# Patient Record
Sex: MALE
[De-identification: names, ages, dates, MRNs, and addresses within clinical notes are randomized per-mention and may not be internally consistent; named-entity substitution may affect disease eponyms.]

---

## 2018-10-21 ENCOUNTER — TRANSCRIPTION ENCOUNTER (OUTPATIENT)
Age: 33
End: 2018-10-21

## 2019-05-18 ENCOUNTER — TRANSCRIPTION ENCOUNTER (OUTPATIENT)
Age: 34
End: 2019-05-18

## 2021-03-29 ENCOUNTER — APPOINTMENT (OUTPATIENT)
Dept: DISASTER EMERGENCY | Facility: OTHER | Age: 36
End: 2021-03-29
Payer: COMMERCIAL

## 2021-03-29 PROCEDURE — 0011A: CPT

## 2021-04-26 ENCOUNTER — APPOINTMENT (OUTPATIENT)
Dept: DISASTER EMERGENCY | Facility: OTHER | Age: 36
End: 2021-04-26

## 2022-10-14 PROBLEM — Z00.00 ENCOUNTER FOR PREVENTIVE HEALTH EXAMINATION: Status: ACTIVE | Noted: 2022-10-14

## 2022-10-15 ENCOUNTER — INPATIENT (INPATIENT)
Facility: HOSPITAL | Age: 37
LOS: 1 days | Discharge: HOME | DRG: 392 | End: 2022-10-17
Attending: STUDENT IN AN ORGANIZED HEALTH CARE EDUCATION/TRAINING PROGRAM | Admitting: STUDENT IN AN ORGANIZED HEALTH CARE EDUCATION/TRAINING PROGRAM
Payer: COMMERCIAL

## 2022-10-15 VITALS
HEIGHT: 68 IN | HEART RATE: 86 BPM | DIASTOLIC BLOOD PRESSURE: 105 MMHG | OXYGEN SATURATION: 98 % | SYSTOLIC BLOOD PRESSURE: 163 MMHG | RESPIRATION RATE: 18 BRPM | WEIGHT: 171.96 LBS | TEMPERATURE: 98 F

## 2022-10-15 DIAGNOSIS — R13.10 DYSPHAGIA, UNSPECIFIED: ICD-10-CM

## 2022-10-15 LAB
ALBUMIN SERPL ELPH-MCNC: 4.8 G/DL — SIGNIFICANT CHANGE UP (ref 3.3–5)
ALP SERPL-CCNC: 46 U/L — SIGNIFICANT CHANGE UP (ref 40–120)
ALT FLD-CCNC: 17 U/L — SIGNIFICANT CHANGE UP (ref 10–45)
ANION GAP SERPL CALC-SCNC: 12 MMOL/L — SIGNIFICANT CHANGE UP (ref 5–17)
AST SERPL-CCNC: 18 U/L — SIGNIFICANT CHANGE UP (ref 10–40)
BASOPHILS # BLD AUTO: 0.07 K/UL — SIGNIFICANT CHANGE UP (ref 0–0.2)
BASOPHILS NFR BLD AUTO: 1.1 % — SIGNIFICANT CHANGE UP (ref 0–2)
BILIRUB SERPL-MCNC: 1 MG/DL — SIGNIFICANT CHANGE UP (ref 0.2–1.2)
BUN SERPL-MCNC: 9 MG/DL — SIGNIFICANT CHANGE UP (ref 7–23)
CALCIUM SERPL-MCNC: 9.7 MG/DL — SIGNIFICANT CHANGE UP (ref 8.4–10.5)
CHLORIDE SERPL-SCNC: 101 MMOL/L — SIGNIFICANT CHANGE UP (ref 96–108)
CO2 SERPL-SCNC: 24 MMOL/L — SIGNIFICANT CHANGE UP (ref 22–31)
CREAT SERPL-MCNC: 1.25 MG/DL — SIGNIFICANT CHANGE UP (ref 0.5–1.3)
EGFR: 77 ML/MIN/1.73M2 — SIGNIFICANT CHANGE UP
EOSINOPHIL # BLD AUTO: 0.08 K/UL — SIGNIFICANT CHANGE UP (ref 0–0.5)
EOSINOPHIL NFR BLD AUTO: 1.2 % — SIGNIFICANT CHANGE UP (ref 0–6)
FLUAV AG NPH QL: SIGNIFICANT CHANGE UP
FLUBV AG NPH QL: SIGNIFICANT CHANGE UP
GLUCOSE SERPL-MCNC: 93 MG/DL — SIGNIFICANT CHANGE UP (ref 70–99)
HCT VFR BLD CALC: 46.9 % — SIGNIFICANT CHANGE UP (ref 39–50)
HGB BLD-MCNC: 16.4 G/DL — SIGNIFICANT CHANGE UP (ref 13–17)
IMM GRANULOCYTES NFR BLD AUTO: 0.3 % — SIGNIFICANT CHANGE UP (ref 0–0.9)
LYMPHOCYTES # BLD AUTO: 2.21 K/UL — SIGNIFICANT CHANGE UP (ref 1–3.3)
LYMPHOCYTES # BLD AUTO: 33.3 % — SIGNIFICANT CHANGE UP (ref 13–44)
MCHC RBC-ENTMCNC: 27.6 PG — SIGNIFICANT CHANGE UP (ref 27–34)
MCHC RBC-ENTMCNC: 35 GM/DL — SIGNIFICANT CHANGE UP (ref 32–36)
MCV RBC AUTO: 78.8 FL — LOW (ref 80–100)
MONOCYTES # BLD AUTO: 0.31 K/UL — SIGNIFICANT CHANGE UP (ref 0–0.9)
MONOCYTES NFR BLD AUTO: 4.7 % — SIGNIFICANT CHANGE UP (ref 2–14)
NEUTROPHILS # BLD AUTO: 3.95 K/UL — SIGNIFICANT CHANGE UP (ref 1.8–7.4)
NEUTROPHILS NFR BLD AUTO: 59.4 % — SIGNIFICANT CHANGE UP (ref 43–77)
NRBC # BLD: 0 /100 WBCS — SIGNIFICANT CHANGE UP (ref 0–0)
PLATELET # BLD AUTO: 266 K/UL — SIGNIFICANT CHANGE UP (ref 150–400)
POTASSIUM SERPL-MCNC: 4 MMOL/L — SIGNIFICANT CHANGE UP (ref 3.5–5.3)
POTASSIUM SERPL-SCNC: 4 MMOL/L — SIGNIFICANT CHANGE UP (ref 3.5–5.3)
PROT SERPL-MCNC: 7.2 G/DL — SIGNIFICANT CHANGE UP (ref 6–8.3)
RBC # BLD: 5.95 M/UL — HIGH (ref 4.2–5.8)
RBC # FLD: 12.5 % — SIGNIFICANT CHANGE UP (ref 10.3–14.5)
RSV RNA NPH QL NAA+NON-PROBE: SIGNIFICANT CHANGE UP
SARS-COV-2 RNA SPEC QL NAA+PROBE: SIGNIFICANT CHANGE UP
SODIUM SERPL-SCNC: 137 MMOL/L — SIGNIFICANT CHANGE UP (ref 135–145)
WBC # BLD: 6.64 K/UL — SIGNIFICANT CHANGE UP (ref 3.8–10.5)
WBC # FLD AUTO: 6.64 K/UL — SIGNIFICANT CHANGE UP (ref 3.8–10.5)

## 2022-10-15 PROCEDURE — 99285 EMERGENCY DEPT VISIT HI MDM: CPT

## 2022-10-15 PROCEDURE — 70490 CT SOFT TISSUE NECK W/O DYE: CPT | Mod: 26,MA

## 2022-10-15 PROCEDURE — 71250 CT THORAX DX C-: CPT | Mod: 26,MA

## 2022-10-15 RX ORDER — NYSTATIN 500MM UNIT
500000 POWDER (EA) MISCELLANEOUS ONCE
Refills: 0 | Status: COMPLETED | OUTPATIENT
Start: 2022-10-15 | End: 2022-10-15

## 2022-10-15 RX ADMIN — Medication 500000 UNIT(S): at 20:55

## 2022-10-15 NOTE — ED PROVIDER NOTE - ATTENDING CONTRIBUTION TO CARE
Attending (Rick Ordaz D.O.):  I have personally seen and examined this patient. I have performed a substantive portion of the visit including all aspects of the medical decision making. Resident, fellow, student, and/or ACP note reviewed. I agree on the plan of care except where noted.    see mdm

## 2022-10-15 NOTE — ED PROVIDER NOTE - PROGRESS NOTE DETAILS
Frank PGY2  GI consulted for dysphagia.  PMD Dr. Trip Stanley Frank PGY2  Admitted to medicine for further management of dyaphagia/ Frank PGY2  Admitted to medicine for further management of dyaphagia/    Attending (Rick Ordaz D.O.):  Patient did not want IV, states it was hurting at site. Site was clean/dry/intact. Iv flushed and pulled back with ease. Discussed with patient that it will be needed if additional blood work is needed or for procedural sedation for anestheisa if GI plans for endoscopy. Patient expressed verbal understanding and stated he would be okay getting another IV. IV removed by this provider. Hemostasis with pressure. Site remained clean/dry/intact after removal.

## 2022-10-15 NOTE — ED PROVIDER NOTE - OBJECTIVE STATEMENT
37y M w/ PMHx of eosinophilic esophagitis w/ prior dilation in the past, currently on Singulair, inhaled steroid, PPI coming in for progressive feeling of dysphagia. Pt is only tolerating liquids and pureed foods. Denies F/C. Had an episode of vomiting after last meal. GI Dr. Mcnally recommended he get a barium swallow and see house GI to see if he needs another dilation.

## 2022-10-15 NOTE — ED CLERICAL - NS ED CLERK NOTE PRE-ARRIVAL INFORMATION; ADDITIONAL PRE-ARRIVAL INFORMATION
CC/Reason For referral: Difficulty Swallowing  Preferred Consultant(if applicable):  Who admits for you (if needed):  Do you have documents you would like to fax over?  Would you still like to speak to an ED attending? Yes

## 2022-10-15 NOTE — ED ADULT NURSE NOTE - OBJECTIVE STATEMENT
37 y old male with PMH of eosiniphilic esophagitis presents to the ED from home for barium swallow. 37 y old male with PMH of eosinophilic esophagitis presents to the ED from home sent by GI for barium swallow. Pt reports he has been having difficulty swallowing, only able to tolerate a pureed diet. Pt has required esophageal dilation in the past. Denies difficulty breathing, pain, SOB. Denies ha, fevers, chills, CP, abd pain, n/v/d, urinary sx. Pt A&O x 4, ambulatory. Respirations nonlabored on RA. Abdomen soft and nontender. Skin warm, dry and intact. Bed locked in lowest position, side rails up and call bell in reach.

## 2022-10-15 NOTE — ED PROVIDER NOTE - CLINICAL SUMMARY MEDICAL DECISION MAKING FREE TEXT BOX
Rick Ordaz (DO): 37y M w/ PMHx of eosinophilic esophagitis w/ prior dilation in the past, currently on Singulair, inhaled steroid, PPI coming in for progressive feeling of dysphagia. Pt is only tolerating liquids and pureed foods. Denies F/C. Had an episode of vomiting after last meal. GI Dr. Mcnally recommended he get a barium swallow and see house GI to see if he needs another dilation. Here is hemodynamically stable except hypertensive. Intact oral pharynx w/o thrush visible, no palpable nodules on throat, neck supple, no stridor. Possibility of candida esophagitis given inhaled steroid. Discussed w/ XR tech who said is unable to get barium swallow at this time. Will get non contrast CT of neck and chest to eval for FB, food impaction, esophageal stricture. Will trial oral nystatin in event of candida esophagitis. All discussed w/ patient who is agreeable to plan.

## 2022-10-16 DIAGNOSIS — K76.9 LIVER DISEASE, UNSPECIFIED: ICD-10-CM

## 2022-10-16 DIAGNOSIS — R13.10 DYSPHAGIA, UNSPECIFIED: ICD-10-CM

## 2022-10-16 DIAGNOSIS — K20.0 EOSINOPHILIC ESOPHAGITIS: ICD-10-CM

## 2022-10-16 PROCEDURE — 12345: CPT | Mod: NC

## 2022-10-16 PROCEDURE — 99223 1ST HOSP IP/OBS HIGH 75: CPT

## 2022-10-16 PROCEDURE — 99253 IP/OBS CNSLTJ NEW/EST LOW 45: CPT | Mod: GC

## 2022-10-16 RX ORDER — PANTOPRAZOLE SODIUM 20 MG/1
40 TABLET, DELAYED RELEASE ORAL
Refills: 0 | Status: DISCONTINUED | OUTPATIENT
Start: 2022-10-16 | End: 2022-10-16

## 2022-10-16 RX ORDER — MONTELUKAST 4 MG/1
10 TABLET, CHEWABLE ORAL DAILY
Refills: 0 | Status: DISCONTINUED | OUTPATIENT
Start: 2022-10-16 | End: 2022-10-17

## 2022-10-16 RX ORDER — SODIUM CHLORIDE 9 MG/ML
1000 INJECTION, SOLUTION INTRAVENOUS
Refills: 0 | Status: DISCONTINUED | OUTPATIENT
Start: 2022-10-16 | End: 2022-10-17

## 2022-10-16 RX ORDER — PANTOPRAZOLE SODIUM 20 MG/1
40 TABLET, DELAYED RELEASE ORAL EVERY 12 HOURS
Refills: 0 | Status: DISCONTINUED | OUTPATIENT
Start: 2022-10-16 | End: 2022-10-17

## 2022-10-16 RX ADMIN — SODIUM CHLORIDE 125 MILLILITER(S): 9 INJECTION, SOLUTION INTRAVENOUS at 20:32

## 2022-10-16 RX ADMIN — PANTOPRAZOLE SODIUM 40 MILLIGRAM(S): 20 TABLET, DELAYED RELEASE ORAL at 20:32

## 2022-10-16 RX ADMIN — PANTOPRAZOLE SODIUM 40 MILLIGRAM(S): 20 TABLET, DELAYED RELEASE ORAL at 06:52

## 2022-10-16 NOTE — CHART NOTE - NSCHARTNOTEFT_GEN_A_CORE
Andre Reyes, M.D.  Pager: [phone]  Office: [phone]    Patient is a 37y old  Male who presents with a chief complaint of dysphagia, regurgitation (16 Oct 2022 02:42)          SUBJECTIVE / OVERNIGHT EVENTS:    No acute overnight events.    ROS: ( - ) Fever, ( - )Chills,  ( - )Nausea/Vomiting, ( - ) Cough, ( - )Shortness of breath, ( - )Chest Pain    MEDICATIONS  (STANDING):  Budesonide 1mg/ 8ml slurry 1 milliGRAM(s) 1 milliGRAM(s) Oral every 12 hours  montelukast 10 milliGRAM(s) Oral daily  pantoprazole    Tablet 40 milliGRAM(s) Oral before breakfast    MEDICATIONS  (PRN):          T(C): 36.7 (10-16 @ 04:44), Max: 37 (10-16 @ 01:46)   HR: 73   BP: 129/91   RR: 18   SpO2: 97%    PHYSICAL EXAM:    CONSTITUTIONAL: NAD, well-developed, well-groomed  EYES: PERRLA; conjunctiva and sclera clear  ENMT: Moist oral mucosa, no pharyngeal injection or exudates; normal dentition  NECK: Supple, no palpable masses; no thyromegaly  RESPIRATORY: Normal respiratory effort; lungs are clear to auscultation bilaterally  CARDIOVASCULAR: Regular rate and rhythm, normal S1 and S2, no murmur/rub/gallop; No lower extremity edema; Peripheral pulses are 2+ bilaterally  ABDOMEN: Nontender to palpation, normoactive bowel sounds, no rebound/guarding; No hepatosplenomegaly  MUSCULOSKELETAL:  Normal gait; no clubbing or cyanosis of digits; no joint swelling or tenderness to palpation  PSYCH: A+O to person, place, and time; affect appropriate  NEUROLOGY: CN 2-12 are intact and symmetric; no gross sensory deficits   SKIN: No rashes; no palpable lesions      LABS:                        16.4   6.64  )-----------( 266      ( 15 Oct 2022 21:06 )             46.9      10-15    137  |  101  |  9   ----------------------------<  93  4.0   |  24  |  1.25    Ca    9.7      15 Oct 2022 21:06    TPro  7.2  /  Alb  4.8  /  TBili  1.0  /  DBili  x   /  AST  18  /  ALT  17  /  AlkPhos  46  10-15       CAPILLARY BLOOD GLUCOSE          RADIOLOGY & ADDITIONAL TESTS:    Imaging Personally Reviewed:  Consultant(s) Notes Reviewed:    Care Discussed with Consultants/Other Providers: Andre Reyes, M.D.  Pager: [phone]  Office: [phone]    Patient is a 37y old  Male who presents with a chief complaint of dysphagia, regurgitation (16 Oct 2022 02:42)          SUBJECTIVE / OVERNIGHT EVENTS:    Patient admitted overnight patient seen and examined this morning at bedside with family at bedside. Patient apprehensive about drinking anything even water.    MEDICATIONS  (STANDING):  Budesonide 1mg/ 8ml slurry 1 milliGRAM(s) 1 milliGRAM(s) Oral every 12 hours  montelukast 10 milliGRAM(s) Oral daily  pantoprazole    Tablet 40 milliGRAM(s) Oral before breakfast    MEDICATIONS  (PRN):          T(C): 36.7 (10-16 @ 04:44), Max: 37 (10-16 @ 01:46)   HR: 73   BP: 129/91   RR: 18   SpO2: 97%    PHYSICAL EXAM:    CONSTITUTIONAL: NAD, well-developed, well-groomed  EYES: PERRLA; conjunctiva and sclera clear  ENMT: Moist oral mucosa, no pharyngeal injection or exudates; normal dentition  NECK: Supple, no palpable masses; no thyromegaly  RESPIRATORY: Normal respiratory effort; lungs are clear to auscultation bilaterally  CARDIOVASCULAR: Regular rate and rhythm, normal S1 and S2, no murmur/rub/gallop; No lower extremity edema; Peripheral pulses are 2+ bilaterally  ABDOMEN: Nontender to palpation, normoactive bowel sounds, no rebound/guarding; No hepatosplenomegaly  MUSCULOSKELETAL:  Normal gait; no clubbing or cyanosis of digits; no joint swelling or tenderness to palpation  PSYCH: A+O to person, place, and time; affect appropriate  NEUROLOGY: CN 2-12 are intact and symmetric; no gross sensory deficits   SKIN: No rashes; no palpable lesions      LABS:                        16.4   6.64  )-----------( 266      ( 15 Oct 2022 21:06 )             46.9      10-15    137  |  101  |  9   ----------------------------<  93  4.0   |  24  |  1.25    Ca    9.7      15 Oct 2022 21:06    TPro  7.2  /  Alb  4.8  /  TBili  1.0  /  DBili  x   /  AST  18  /  ALT  17  /  AlkPhos  46  10-15     37M c hx esosinophilic esophagitis pw worsening dysphagia.  Patient seen by gastroenterology the plan is for an endoscopy tomorrow.   Patient made aware of the plan.  Will start the patient on some gentle Ivy hydration because he's apprehensive about drinking liquids.   Will give the patient a clear liquid diet for now and make him impure after midnights for procedure tomorrow.   Further planning pending the endoscopic evaluation

## 2022-10-16 NOTE — H&P ADULT - NSHPSOCIALHISTORY_GEN_ALL_CORE
Social History:    Marital Status: ( x ) , (  ) Single, (  ) , (  ) , (  )   # of Children: 1  Lives with: (  ) alone, ( x ) children, ( x ) spouse, (  ) parents, (  ) siblings, (  ) friends, (  ) other:   Occupation:     Substance Use/Illicit Drugs: (  ) never used vs other:   Tobacco Usage: ( x ) never smoked, (  ) former smoker, (  ) current smoker and Total Pack-Years:   Last Alcohol Usage/Frequency/Amount/Withdrawal/Hx of Abuse:  none  Foreign travel:   Animal exposure:

## 2022-10-16 NOTE — CONSULT NOTE ADULT - ASSESSMENT
#Dysphagia  #EoE  Will need rule out stricture although seems less likely, most likely due to untreated EoE    Recs:  -IV PPI BID  - clear liquid diet, NPO after midnight  - Plan for EGD +/- dilation tomorrow    Recommendations preliminary until signed by attending.     Ehsan Barroso MD  Gastroenterology/Hepatology Fellow  Contact on-call GI fellow via answering service ([phone]) from 5pm-8am AND on weekends/holidays

## 2022-10-16 NOTE — H&P ADULT - NSHPLABSRESULTS_GEN_ALL_CORE
Personally reviewed old records.  Personally reviewed labs.  Personally reviewed imaging.                        16.4   6.64  )-----------( 266      ( 15 Oct 2022 21:06 )             46.9       10-15    137  |  101  |  9   ----------------------------<  93  4.0   |  24  |  1.25    Ca    9.7      15 Oct 2022 21:06    TPro  7.2  /  Alb  4.8  /  TBili  1.0  /  DBili  x   /  AST  18  /  ALT  17  /  AlkPhos  46  10-15            LIVER FUNCTIONS - ( 15 Oct 2022 21:06 )  Alb: 4.8 g/dL / Pro: 7.2 g/dL / ALK PHOS: 46 U/L / ALT: 17 U/L / AST: 18 U/L / GGT: x

## 2022-10-16 NOTE — CONSULT NOTE ADULT - ATTENDING COMMENTS
Assessment:   Mr. Brink is a 37 year old gentleman with significant history of eosinophillic esophagitis who presented with worsening symptoms of dysphagia initially to solids then liquids for the past 7-10 days (more intensely over last 48 hours prompting ED evaluation). He spoke with his outpatient GI doctor who recommended he present to the ED as well. He has had a known history of dysphagia for the past 5 years, last EGD was about 2 years ago. He reports that the diagnosis was made and he was empirically dilated at that time which offered some relief of symptoms. Since that time he has only experienced sporadic episodes as he had been more careful and strict with his diet. He has no been adherent to all recommended therapies but has had intermittent treatment. He recently started oral Singulair / Fluticasone and Omeprazole for approximately 1 week but his symptoms progressively worsened despite these therapies. Patient denies regurgitation (as he has not had solid foods in days), tolerating minimal PO clear liquids mostly water and has been tolerating secretions without difficulty. Presentation consistent with underlying EoE, can not rule out stricture/stenosis, would favor inpatient non-urgent upper endoscopy with biopsies and potentially esophageal dilation. Patient amenable to current plan of care.     Plan:  Make NPO at midnight.  OK for clear liquids until midnight.   Would give IV PPI BID (until cleared for oral intake then transition to PO)  Will add on for EGD +/- dilation tomorrow.     Thank you for allowing us to participate in the continued care of Mr. Brink.  We will continue to follow along with you.

## 2022-10-16 NOTE — CONSULT NOTE ADULT - SUBJECTIVE AND OBJECTIVE BOX
HPI:  37M c hx esosinophilic esophagitis pw worsening dysphagia. Pt states he's had dysphagia for about 4 yrs. he had an EGD 2 years ago and reportedly had dilation then, and was diagnosed with EE. Pt reports having changed his diet but was not on any medications until 1 week ago, when he had an episode of regurgitation after eating ravioli. Pt states since then he has been unable to tolerate significant solids, eating mostly soft pasta and soup. He was started on oral singulair, fluticasone, and omeprazole. Given his worsening symptoms, pt was sent in by his GI doctor for a barium swallow test and for house GI to poss scope and dilated. last bm yesterday. Reports able to drink liquids with issue. No fevers. Reports symptoms of epigastric "heart burn" pain.    Allergies:  No Known Allergies        Hospital Medications:  Budesonide 1mg/ 8ml slurry 1 milliGRAM(s) 1 milliGRAM(s) Oral every 12 hours  montelukast 10 milliGRAM(s) Oral daily  pantoprazole    Tablet 40 milliGRAM(s) Oral before breakfast      PMHX/PSHX:  Eosinophilic esophagitis    No significant past surgical history        Family history:  No pertinent family history in first degree relatives    Family history of early CAD (Father)        Social History: no smoking    ROS:   General:  No fevers, chills or night sweats  ENT:  No sore throat or dysphagia  CV:  No pain or palpitations  Resp:  No dyspnea, cough or  wheezing  GI:  as above  Skin:  No rash or edema  Neuro: no weakness   Hematologic: no bleeding  Musculoskeletal: no muscle pain or join pain  Psych: no agitation     : no dysuria      PHYSICAL EXAM:   GENERAL:  NAD, Appears stated age  HEENT:  NC/AT,  conjunctivae clear and pink, sclera -anicteric  CHEST:  CTA B/L, Normal effort  HEART:  RRR S1/S2,  ABDOMEN:  Soft, non-tender, non-distended,  no masses   EXTREMITIES:  No cyanosis or Edema  SKIN:  Warm & Dry. No rash or erythema  NEURO:  Alert, oriented, no focal deficit    Vital Signs:  Vital Signs Last 24 Hrs  T(C): 36.7 (16 Oct 2022 04:44), Max: 37 (16 Oct 2022 01:46)  T(F): 98.1 (16 Oct 2022 04:44), Max: 98.6 (16 Oct 2022 01:46)  HR: 73 (16 Oct 2022 04:44) (70 - 86)  BP: 129/91 (16 Oct 2022 04:44) (129/91 - 163/105)  BP(mean): --  RR: 18 (16 Oct 2022 04:44) (16 - 18)  SpO2: 97% (16 Oct 2022 04:44) (96% - 98%)    Parameters below as of 16 Oct 2022 04:44  Patient On (Oxygen Delivery Method): room air      Daily Height in cm: 172.72 (15 Oct 2022 16:57)    Daily     LABS:                        16.4   6.64  )-----------( 266      ( 15 Oct 2022 21:06 )             46.9     Mean Cell Volume: 78.8 fl (10-15-22 @ 21:06)    10-15    137  |  101  |  9   ----------------------------<  93  4.0   |  24  |  1.25    Ca    9.7      15 Oct 2022 21:06    TPro  7.2  /  Alb  4.8  /  TBili  1.0  /  DBili  x   /  AST  18  /  ALT  17  /  AlkPhos  46  10-15    LIVER FUNCTIONS - ( 15 Oct 2022 21:06 )  Alb: 4.8 g/dL / Pro: 7.2 g/dL / ALK PHOS: 46 U/L / ALT: 17 U/L / AST: 18 U/L / GGT: x                                       16.4   6.64  )-----------( 266      ( 15 Oct 2022 21:06 )             46.9     Imaging:     HPI:  37M c hx esosinophilic esophagitis pw worsening dysphagia. Pt states he's had dysphagia for about 4 yrs. He had an EGD 2 years ago and reportedly had dilation then, and was diagnosed with EE. Since dilation, has been having couple episode dysphagia but self resolves. About 1.5 weeks ago, sxs advanced and worsened. Reports having changed his diet but was not on any medications until 1 week ago, when he had an episode of regurgitation after eating ravioli. Patient was started on oral singulair, fluticasone, and omeprazole 1 week ago and sxs got worst. Given his worsening symptoms, pt was sent in by his GI doctor for a barium swallow test and for house GI to poss scope and dilated. At bedside, patient having issues swallowing even water feels things are tight.     Allergies:  No Known Allergies        Hospital Medications:  Budesonide 1mg/ 8ml slurry 1 milliGRAM(s) 1 milliGRAM(s) Oral every 12 hours  montelukast 10 milliGRAM(s) Oral daily  pantoprazole    Tablet 40 milliGRAM(s) Oral before breakfast      PMHX/PSHX:  Eosinophilic esophagitis    No significant past surgical history        Family history:  No pertinent family history in first degree relatives    Family history of early CAD (Father)        Social History: no smoking    ROS:   General:  No fevers, chills or night sweats  ENT:  No sore throat or dysphagia  CV:  No pain or palpitations  Resp:  No dyspnea, cough or  wheezing  GI:  as above  Skin:  No rash or edema  Neuro: no weakness   Hematologic: no bleeding  Musculoskeletal: no muscle pain or join pain  Psych: no agitation     : no dysuria      PHYSICAL EXAM:   GENERAL:  NAD, Appears stated age  HEENT:  NC/AT,  conjunctivae clear and pink, sclera -anicteric  CHEST:  CTA B/L, Normal effort  HEART:  RRR S1/S2,  ABDOMEN:  Soft, non-tender, non-distended,  no masses   EXTREMITIES:  No cyanosis or Edema  SKIN:  Warm & Dry. No rash or erythema  NEURO:  Alert, oriented, no focal deficit    Vital Signs:  Vital Signs Last 24 Hrs  T(C): 36.7 (16 Oct 2022 04:44), Max: 37 (16 Oct 2022 01:46)  T(F): 98.1 (16 Oct 2022 04:44), Max: 98.6 (16 Oct 2022 01:46)  HR: 73 (16 Oct 2022 04:44) (70 - 86)  BP: 129/91 (16 Oct 2022 04:44) (129/91 - 163/105)  BP(mean): --  RR: 18 (16 Oct 2022 04:44) (16 - 18)  SpO2: 97% (16 Oct 2022 04:44) (96% - 98%)    Parameters below as of 16 Oct 2022 04:44  Patient On (Oxygen Delivery Method): room air      Daily Height in cm: 172.72 (15 Oct 2022 16:57)    Daily     LABS:                        16.4   6.64  )-----------( 266      ( 15 Oct 2022 21:06 )             46.9     Mean Cell Volume: 78.8 fl (10-15-22 @ 21:06)    10-15    137  |  101  |  9   ----------------------------<  93  4.0   |  24  |  1.25    Ca    9.7      15 Oct 2022 21:06    TPro  7.2  /  Alb  4.8  /  TBili  1.0  /  DBili  x   /  AST  18  /  ALT  17  /  AlkPhos  46  10-15    LIVER FUNCTIONS - ( 15 Oct 2022 21:06 )  Alb: 4.8 g/dL / Pro: 7.2 g/dL / ALK PHOS: 46 U/L / ALT: 17 U/L / AST: 18 U/L / GGT: x                                       16.4   6.64  )-----------( 266      ( 15 Oct 2022 21:06 )             46.9     Imaging:  Reviewed

## 2022-10-16 NOTE — H&P ADULT - HISTORY OF PRESENT ILLNESS
37M c hx esosinophilic esophagitis pw worsening dysphagia.    Pt states he's had dysphagia for about 4 yrs. he had an EGD 2 years ago and reportedly had dilation then, and was diagnosed with EE. Pt reports having changed his diet but was not on any medications until 1 week ago, when he had an episode of regurgitation after eating ravioli. Pt states since then he has been unable to tolerate significant solids, eating mostly soft pasta and soup. He was started on oral singulair, fluticasone, and omeprazole. Given his worsening symptoms, pt was sent in by his GI doctor for a barium swallow test and for house GI to poss scope and dilated. last bm yesterday. Reports able to drink liquids with issue. No fevers. Reports symptoms of epigastric "heart burn" pain.

## 2022-10-16 NOTE — H&P ADULT - NSHPPHYSICALEXAM_GEN_ALL_CORE
PHYSICAL EXAM:   GENERAL: Alert. Not confused. No acute distress. Not thin. Not cachectic. Not obese.  HEAD:  Atraumatic. Normocephalic.  EYES: EOMI. PERRLA. Normal conjunctiva/sclera.  ENT: Neck supple. No JVD. Moist oral mucosa. Not edentulous. No thrush.  LYMPH: Normal supraclavicular/cervical lymph nodes.   CARDIAC: Not tachy, Not liset. Regular rhythm. Not irregularly irregular. S1. S2.   LUNG/CHEST: CTAB. BS equal bilaterally. No wheezes. No rales. No rhonchi.  ABDOMEN: Soft. No tenderness. No distension. No fluid wave. Normal bowel sounds.  BACK: No midline/vertebral tenderness. No flank tenderness.  VASCULAR: +2 b/l radial or ulnar pulses. Palpable DP pulses.  EXTREMITIES:  No clubbing. No cyanosis. No edema. Moving all 4.  NEUROLOGY: A&Ox3. Non-focal exam. Cranial nerves intact. Normal speech. Sensation intact.  PSYCH: Normal behavior. Normal affect.  SKIN: No jaundice. No erythema. No rash/lesion.  ICU Vital Signs Last 24 Hrs  T(C): 37 (16 Oct 2022 01:46), Max: 37 (16 Oct 2022 01:46)  T(F): 98.6 (16 Oct 2022 01:46), Max: 98.6 (16 Oct 2022 01:46)  HR: 70 (16 Oct 2022 01:46) (70 - 86)  BP: 141/100 (16 Oct 2022 01:46) (141/100 - 163/105)  BP(mean): --  ABP: --  ABP(mean): --  RR: 18 (16 Oct 2022 01:46) (16 - 18)  SpO2: 96% (16 Oct 2022 01:46) (96% - 98%)    O2 Parameters below as of 16 Oct 2022 01:46  Patient On (Oxygen Delivery Method): room air          I&O's Summary

## 2022-10-16 NOTE — PATIENT PROFILE ADULT - FALL HARM RISK - UNIVERSAL INTERVENTIONS
Bed in lowest position, wheels locked, appropriate side rails in place/Call bell, personal items and telephone in reach/Instruct patient to call for assistance before getting out of bed or chair/Non-slip footwear when patient is out of bed/[place]buck to call system/Physically safe environment - no spills, clutter or unnecessary equipment/Purposeful Proactive Rounding/Room/bathroom lighting operational, light cord in reach

## 2022-10-16 NOTE — H&P ADULT - NSHPREVIEWOFSYSTEMS_GEN_ALL_CORE
REVIEW OF SYSTEMS:  CONSTITUTIONAL: No weakness. No fevers. No chills. No rigors. No poor appetite.  EYES: No blurry or double vision. No eye pain.  ENT: No hearing difficulty. No sore throat. No Sinusitis/rhinorrhea.  +dysphagia  NECK: No pain. No stiffness/rigidity.  CARDIAC: No chest pain. No palpitations. No lightheadedness. No syncope.  RESPIRATORY: No cough. No SOB.   GASTROINTESTINAL: +abdominal pain. No nausea. +vomiting. No diarrhea. No constipation.   GENITOURINARY: No dysuria. No frequency. No oliguria.  NEUROLOGICAL: No numbness/tingling. No focal weakness. No headache. No unsteady gait.  BACK: No back pain. No flank pain.  EXTREMITIES: No lower extremity edema. Full ROM. No joint pain.  SKIN: No rashes. No itching. No other lesions.  PSYCHIATRIC: No depression. No anxiety.   ALLERGIC: No lip swelling. No hives.  All other review of systems is negative unless indicated above.  Unless indicated above, unable to assess ROS 2/2

## 2022-10-16 NOTE — H&P ADULT - PROBLEM SELECTOR PLAN 1
- house gi consult  - will order mbs  - will keep npo for now for poss EGD  - IVF - house gi consult  - will order barium swallow  - will keep npo for now for poss EGD  - IVF

## 2022-10-17 ENCOUNTER — TRANSCRIPTION ENCOUNTER (OUTPATIENT)
Age: 37
End: 2022-10-17

## 2022-10-17 ENCOUNTER — RESULT REVIEW (OUTPATIENT)
Age: 37
End: 2022-10-17

## 2022-10-17 VITALS
HEART RATE: 95 BPM | TEMPERATURE: 99 F | DIASTOLIC BLOOD PRESSURE: 88 MMHG | RESPIRATION RATE: 16 BRPM | SYSTOLIC BLOOD PRESSURE: 129 MMHG | OXYGEN SATURATION: 95 %

## 2022-10-17 PROCEDURE — 88305 TISSUE EXAM BY PATHOLOGIST: CPT

## 2022-10-17 PROCEDURE — 99285 EMERGENCY DEPT VISIT HI MDM: CPT | Mod: 25

## 2022-10-17 PROCEDURE — 87637 SARSCOV2&INF A&B&RSV AMP PRB: CPT

## 2022-10-17 PROCEDURE — 99239 HOSP IP/OBS DSCHRG MGMT >30: CPT

## 2022-10-17 PROCEDURE — 36415 COLL VENOUS BLD VENIPUNCTURE: CPT

## 2022-10-17 PROCEDURE — 88305 TISSUE EXAM BY PATHOLOGIST: CPT | Mod: 26

## 2022-10-17 PROCEDURE — 85025 COMPLETE CBC W/AUTO DIFF WBC: CPT

## 2022-10-17 PROCEDURE — 70490 CT SOFT TISSUE NECK W/O DYE: CPT | Mod: MA

## 2022-10-17 PROCEDURE — 71250 CT THORAX DX C-: CPT | Mod: MA

## 2022-10-17 PROCEDURE — 94640 AIRWAY INHALATION TREATMENT: CPT

## 2022-10-17 PROCEDURE — 80053 COMPREHEN METABOLIC PANEL: CPT

## 2022-10-17 PROCEDURE — 43239 EGD BIOPSY SINGLE/MULTIPLE: CPT | Mod: GC

## 2022-10-17 RX ORDER — ONDANSETRON 8 MG/1
4 TABLET, FILM COATED ORAL ONCE
Refills: 0 | Status: COMPLETED | OUTPATIENT
Start: 2022-10-17 | End: 2022-10-17

## 2022-10-17 RX ORDER — MOMETASONE FUROATE 220 UG/1
2 INHALANT RESPIRATORY (INHALATION)
Refills: 0 | Status: DISCONTINUED | OUTPATIENT
Start: 2022-10-17 | End: 2022-10-17

## 2022-10-17 RX ORDER — PANTOPRAZOLE SODIUM 20 MG/1
40 TABLET, DELAYED RELEASE ORAL
Refills: 0 | Status: DISCONTINUED | OUTPATIENT
Start: 2022-10-17 | End: 2022-10-17

## 2022-10-17 RX ADMIN — PANTOPRAZOLE SODIUM 40 MILLIGRAM(S): 20 TABLET, DELAYED RELEASE ORAL at 10:27

## 2022-10-17 RX ADMIN — MOMETASONE FUROATE 2 PUFF(S): 220 INHALANT RESPIRATORY (INHALATION) at 14:32

## 2022-10-17 RX ADMIN — ONDANSETRON 4 MILLIGRAM(S): 8 TABLET, FILM COATED ORAL at 13:53

## 2022-10-17 RX ADMIN — MONTELUKAST 10 MILLIGRAM(S): 4 TABLET, CHEWABLE ORAL at 13:53

## 2022-10-17 NOTE — PRE PROCEDURE NOTE - PRE PROCEDURE EVALUATION
Attending Physician:  Erik                 Procedure: EGD    Indication for Procedure: dysphagia  ________________________________________________________  PAST MEDICAL & SURGICAL HISTORY:  Eosinophilic esophagitis      No significant past surgical history        ALLERGIES:  No Known Allergies    HOME MEDICATIONS:  Flovent  mcg/inh inhalation aerosol: 2 puff(s) orally 2 times a day  omeprazole 20 mg oral delayed release tablet: 1 tab(s) orally once a day  Singulair 10 mg oral tablet: 1 tab(s) orally once a day    AICD/PPM: [ ] yes   [ ] no    PERTINENT LAB DATA:                        16.4   6.64  )-----------( 266      ( 15 Oct 2022 21:06 )             46.9     10-15    137  |  101  |  9   ----------------------------<  93  4.0   |  24  |  1.25    Ca    9.7      15 Oct 2022 21:06    TPro  7.2  /  Alb  4.8  /  TBili  1.0  /  DBili  x   /  AST  18  /  ALT  17  /  AlkPhos  46  10-15                PHYSICAL EXAMINATION:    Height (cm): 172.7  Weight (kg): 78  BMI (kg/m2): 26.2  BSA (m2): 1.92T(C): 36.8  HR: 91  BP: 166/96  RR: 22  SpO2: 100%    Constitutional: NAD  HEENT: PERRLA, EOMI,    Neck:  No JVD  Respiratory: CTAB/L  Cardiovascular: S1 and S2  Gastrointestinal: BS+, soft, NT/ND  Extremities: No peripheral edema  Neurological: A/O x 3, no focal deficits  Psychiatric: Normal mood, normal affect  Skin: No rashes    ASA Class: I [x]  II [ ]  III [ ]  IV [ ]    COMMENTS:    The patient is a suitable candidate for the planned procedure unless box checked [ ]  No, explain:

## 2022-10-17 NOTE — DISCHARGE NOTE PROVIDER - NSDCMRMEDTOKEN_GEN_ALL_CORE_FT
Flovent  mcg/inh inhalation aerosol: 2 puff(s) orally 2 times a day  omeprazole 20 mg oral delayed release tablet: 1 tab(s) orally 2 times a day  Singulair 10 mg oral tablet: 1 tab(s) orally once a day

## 2022-10-17 NOTE — PROGRESS NOTE ADULT - PROBLEM SELECTOR PLAN 2
- GI recommends PPI BID and ICS vs. slurry (recommendations relayed to patient & he states he has this at home)  - s/p EGD with biopsies to esophagus, duodenum and stomach. He will follow up with his GI Juan Mcnally who works at [facility] but has privileges here

## 2022-10-17 NOTE — DISCHARGE NOTE PROVIDER - PROVIDER TOKENS
PROVIDER:[TOKEN:[[id number]]],PROVIDER:[TOKEN:[[id number]]] PROVIDER:[TOKEN:[[id number]],FOLLOWUP:[2 weeks],ESTABLISHEDPATIENT:[T]],PROVIDER:[TOKEN:[[id number]]]

## 2022-10-17 NOTE — DISCHARGE NOTE PROVIDER - NSDCCPCAREPLAN_GEN_ALL_CORE_FT
PRINCIPAL DISCHARGE DIAGNOSIS  Diagnosis: Dysphagia  Assessment and Plan of Treatment:       SECONDARY DISCHARGE DIAGNOSES  Diagnosis: Eosinophilic esophagitis  Assessment and Plan of Treatment:      PRINCIPAL DISCHARGE DIAGNOSIS  Diagnosis: Dysphagia  Assessment and Plan of Treatment: s/p endoscopy:  Esophageal mucosal changes consistent with eosinophilic esophagitis.                        Biopsied.                       - Normal stomach. Biopsied.                       - Congested duodenal mucosa. Biopsied.  Recommendation:                         - Soft diet. Avoid dry bread, large proteins like steaks/chicken breast.                       - Continue PPI BID.                       - Continue swallowed fluticasone for eosinophilic esophagitis.                       - Await pathology results.                       - Follow-up with primary GI Dr. Mcnally as outpatient.                                                                                                              SECONDARY DISCHARGE DIAGNOSES  Diagnosis: Eosinophilic esophagitis  Assessment and Plan of Treatment: Continue current medications. follow up with Dr Mcnally

## 2022-10-17 NOTE — DISCHARGE NOTE PROVIDER - CARE PROVIDER_API CALL
Juan Mcnally  GASTROENTEROLOGY  [address] [place] [address]  Phone: [phone]  Fax: [phone]  Follow Up Time:     Trip Stanley  INTERNAL MEDICINE  [address] [place] [address]  Phone: [phone]  Fax: [phone]  Follow Up Time:    Juan Mcnally  GASTROENTEROLOGY  [address] [place] [address]  Phone: [phone]  Fax: [phone]  Established Patient  Follow Up Time: 2 weeks    Trip Stanley  INTERNAL MEDICINE  [address] [place] [address]  Phone: [phone]  Fax: [phone]  Follow Up Time:

## 2022-10-17 NOTE — PROGRESS NOTE ADULT - SUBJECTIVE AND OBJECTIVE BOX
Patient is a 37y old  Male who presents with a chief complaint of dysphagia, regurgitation (17 Oct 2022 15:18)      SUBJECTIVE / OVERNIGHT EVENTS: Patient seen and examined at bedside. No acute events overnight. Patient s/p EGD which showed findings consistent with EoE.    MEDICATIONS  (STANDING):  mometasone 220 MICROgram(s) Inhaler 2 Puff(s) Inhalation two times a day  montelukast 10 milliGRAM(s) Oral daily  pantoprazole   Suspension 40 milliGRAM(s) Oral two times a day    MEDICATIONS  (PRN):      CAPILLARY BLOOD GLUCOSE        I&O's Summary    16 Oct 2022 07:01  -  17 Oct 2022 07:00  --------------------------------------------------------  IN: 2730 mL / OUT: 0 mL / NET: 2730 mL        PHYSICAL EXAM:  Vital Signs Last 24 Hrs  T(C): 37.3 (17 Oct 2022 17:33), Max: 37.3 (17 Oct 2022 17:33)  T(F): 99.2 (17 Oct 2022 17:33), Max: 99.2 (17 Oct 2022 17:33)  HR: 95 (17 Oct 2022 17:33) (67 - 108)  BP: 129/88 (17 Oct 2022 17:33) (102/66 - 166/96)  BP(mean): --  RR: 16 (17 Oct 2022 17:33) (16 - 22)  SpO2: 95% (17 Oct 2022 17:33) (94% - 100%)    Parameters below as of 17 Oct 2022 17:33  Patient On (Oxygen Delivery Method): room air        GEN: male in NAD, appears comfortable, no diaphoresis  EYES: No scleral injection, PERRL, EOMI  ENTM: neck supple & symmetric without tracheal deviation, moist membranes, no gross hearing impairment, thyroid gland not enlarged  CV: +S1/S2, no m/r/g, no abdominal bruit, no LE edema  RESP: breathing comfortably, no respiratory accessory muscle use, CTAB, no w/r/r  GI: normoactive BS, soft, NTND, no rebounding/guarding, no palpable masses    LABS:                        16.4   6.64  )-----------( 266      ( 15 Oct 2022 21:06 )             46.9     10-15    137  |  101  |  9   ----------------------------<  93  4.0   |  24  |  1.25    Ca    9.7      15 Oct 2022 21:06    TPro  7.2  /  Alb  4.8  /  TBili  1.0  /  DBili  x   /  AST  18  /  ALT  17  /  AlkPhos  46  10-15                RADIOLOGY & ADDITIONAL TESTS:  Results Reviewed:   Imaging Personally Reviewed:  Electrocardiogram Personally Reviewed:    COORDINATION OF CARE:  Care Discussed with Consultants/Other Providers [Y/N]:  Prior or Outpatient Records Reviewed [Y/N]:

## 2022-10-17 NOTE — PROGRESS NOTE ADULT - PROBLEM SELECTOR PLAN 1
- house gi consulted & performed EGD which showed findings consistent with EoE  - Tolerating soft diet, plan for EoE as below

## 2022-10-17 NOTE — DISCHARGE NOTE NURSING/CASE MANAGEMENT/SOCIAL WORK - NSDCPEFALRISK_GEN_ALL_CORE
For information on Fall & Injury Prevention, visit: [email or web address] OR  [email or web address] OR  https://www.cdc.gov/steadi/patient.html

## 2022-10-17 NOTE — DISCHARGE NOTE PROVIDER - HOSPITAL COURSE
37M c hx esosinophilic esophagitis pw worsening dysphagia. Pt states he's had dysphagia for about 4 yrs. He had an EGD 2 years ago and reportedly had dilation then, and was diagnosed with EE. Since dilation, has been having couple episode dysphagia but self resolves. About 1.5 weeks ago, sxs advanced and worsened. Reports having changed his diet but was not on any medications until 1 week ago, when he had an episode of regurgitation after eating ravioli. Patient was started on oral singulair, fluticasone, and omeprazole 1 week ago and sxs got worst. Given his worsening symptoms, pt was sent in by his GI doctor for a barium swallow test and for house GI to poss scope and dilated. At bedside, patient having issues swallowing even water feels things are tight.   s/p endpscopy on 10/17/22, findings CW eosinophilic eosophagitis. Resume soft diet. Cleared for discharge.   37M c hx esosinophilic esophagitis pw worsening dysphagia. Pt states he's had dysphagia for about 4 yrs. He had an EGD 2 years ago and reportedly had dilation then, and was diagnosed with EE. Since dilation, has been having couple episode dysphagia but self resolves. About 1.5 weeks ago, sxs advanced and worsened. Reports having changed his diet but was not on any medications until 1 week ago, when he had an episode of regurgitation after eating ravioli. Patient was started on oral singulair, fluticasone, and omeprazole 1 week ago and sxs got worst. Given his worsening symptoms, pt was sent in by his GI doctor for a barium swallow test and for house GI to poss scope and dilated. At bedside, patient having issues swallowing even water feels things are tight.   s/p endpscopy on 10/17/22, findings CW eosinophilic eosophagitis. Resume soft diet. Cleared for discharge. DC medications as recommended by Dr Burk.

## 2022-10-17 NOTE — PROGRESS NOTE ADULT - NSPROGADDITIONALINFOA_GEN_ALL_CORE
Patient medically optimized for d/c. He will follow up with private GI    Discharge Time: 40 minutes

## 2022-10-17 NOTE — DISCHARGE NOTE NURSING/CASE MANAGEMENT/SOCIAL WORK - PATIENT PORTAL LINK FT
You can access the FollowMyHealth Patient Portal offered by [facility] by registering at the following website: [email or web address]. By joining [employer]s FollowMyHealth portal, you will also be able to view your health information using other applications (apps) compatible with our system.

## 2022-10-18 ENCOUNTER — APPOINTMENT (OUTPATIENT)
Dept: RADIOLOGY | Facility: HOSPITAL | Age: 37
End: 2022-10-18

## 2022-10-18 RX ORDER — OMEPRAZOLE 10 MG/1
1 CAPSULE, DELAYED RELEASE ORAL
Qty: 0 | Refills: 0 | DISCHARGE

## 2022-10-18 RX ORDER — FLUTICASONE PROPIONATE 220 MCG
2 AEROSOL WITH ADAPTER (GRAM) INHALATION
Qty: 0 | Refills: 0 | DISCHARGE

## 2022-10-18 RX ORDER — MONTELUKAST 4 MG/1
1 TABLET, CHEWABLE ORAL
Qty: 0 | Refills: 0 | DISCHARGE

## 2022-10-21 ENCOUNTER — TRANSCRIPTION ENCOUNTER (OUTPATIENT)
Age: 37
End: 2022-10-21

## 2023-10-22 ENCOUNTER — NON-APPOINTMENT (OUTPATIENT)
Age: 38
End: 2023-10-22

## 2025-03-09 ENCOUNTER — NON-APPOINTMENT (OUTPATIENT)
Age: 40
End: 2025-03-09
